# Patient Record
Sex: MALE | Race: WHITE | NOT HISPANIC OR LATINO | Employment: FULL TIME | URBAN - METROPOLITAN AREA
[De-identification: names, ages, dates, MRNs, and addresses within clinical notes are randomized per-mention and may not be internally consistent; named-entity substitution may affect disease eponyms.]

---

## 2023-09-30 ENCOUNTER — OFFICE VISIT (OUTPATIENT)
Dept: URGENT CARE | Facility: CLINIC | Age: 28
End: 2023-09-30
Payer: COMMERCIAL

## 2023-09-30 VITALS
DIASTOLIC BLOOD PRESSURE: 76 MMHG | HEART RATE: 77 BPM | TEMPERATURE: 97.1 F | HEIGHT: 70 IN | RESPIRATION RATE: 14 BRPM | SYSTOLIC BLOOD PRESSURE: 124 MMHG | WEIGHT: 232.1 LBS | OXYGEN SATURATION: 98 % | BODY MASS INDEX: 33.23 KG/M2

## 2023-09-30 DIAGNOSIS — M48.02 CERVICAL SPINAL STENOSIS: ICD-10-CM

## 2023-09-30 DIAGNOSIS — M54.2 CHRONIC NECK PAIN: ICD-10-CM

## 2023-09-30 DIAGNOSIS — G89.29 CHRONIC NECK PAIN: ICD-10-CM

## 2023-09-30 PROCEDURE — 99204 OFFICE O/P NEW MOD 45 MIN: CPT | Performed by: FAMILY MEDICINE

## 2023-09-30 PROCEDURE — 3078F DIAST BP <80 MM HG: CPT | Performed by: FAMILY MEDICINE

## 2023-09-30 PROCEDURE — 3074F SYST BP LT 130 MM HG: CPT | Performed by: FAMILY MEDICINE

## 2023-09-30 RX ORDER — PREDNISONE 20 MG/1
40 TABLET ORAL DAILY
Qty: 10 TABLET | Refills: 0 | Status: SHIPPED | OUTPATIENT
Start: 2023-09-30 | End: 2023-10-05

## 2023-09-30 ASSESSMENT — ENCOUNTER SYMPTOMS
WEIGHT LOSS: 0
EYE REDNESS: 0
VOMITING: 0
MYALGIAS: 0
EYE DISCHARGE: 0
NAUSEA: 0

## 2023-09-30 NOTE — LETTER
September 30, 2023         Patient: John Farrell   YOB: 1995   Date of Visit: 9/30/2023           To Whom it May Concern:    John Farrell was seen in my clinic on 9/30/2023. Please excuse from work today.     Sincerely,           Nicholas West M.D.  Electronically Signed      (0) independent

## 2023-09-30 NOTE — PROGRESS NOTES
"Zara Farrell is a 28 y.o. male who presents with Back Pain (Pt has back pain, neck pain x 2 weeks )            PMH chronic neck pain with known severe cervical stenosis thought to possibly have a genetic component. 2 weeks worsening pain associated with his work duties. Intermittent radiation to left arm. Intermittent  weakness/more on the left. No new trauma. No fever. No urinary retention, bowel incontinence or saddle anesthesia.         Review of Systems   Constitutional:  Negative for malaise/fatigue and weight loss.   Eyes:  Negative for discharge and redness.   Gastrointestinal:  Negative for nausea and vomiting.   Musculoskeletal:  Negative for joint pain and myalgias.   Skin:  Negative for itching and rash.              Objective     /76 (BP Location: Right arm, Patient Position: Sitting, BP Cuff Size: Large adult)   Pulse 77   Temp 36.2 °C (97.1 °F) (Temporal)   Resp 14   Ht 1.778 m (5' 10\")   Wt 105 kg (232 lb 1.6 oz)   SpO2 98%   BMI 33.30 kg/m²      Physical Exam  Constitutional:       General: He is not in acute distress.     Appearance: He is well-developed.   HENT:      Head: Normocephalic and atraumatic.   Eyes:      Conjunctiva/sclera: Conjunctivae normal.   Neck:      Comments: Tender midline. FROM. No deformity. Pain reproduced with full flexion.   Skin:     General: Skin is warm and dry.      Findings: No rash.   Neurological:      General: No focal deficit present.      Mental Status: He is alert.      Gait: Gait normal.      Comments: No appreciable weakness today.                              Assessment & Plan     Reviewed previous PCP note on his phone that stated MRI showed severe cervical stenosis C4-C5 region.     1. Cervical spinal stenosis  Referral to establish with [facility]    Referral to Spine Surgery    predniSONE (DELTASONE) 20 MG Tab      2. Chronic neck pain  Referral to establish with [facility] PCP    Referral to Spine Surgery    predniSONE " (DELTASONE) 20 MG Tab        Differential diagnosis, natural history, supportive care, and indications for immediate follow-up were discussed.     To ER with persistent weakness or myelopathy symptoms as discussed.

## 2023-10-14 SDOH — HEALTH STABILITY: PHYSICAL HEALTH: ON AVERAGE, HOW MANY DAYS PER WEEK DO YOU ENGAGE IN MODERATE TO STRENUOUS EXERCISE (LIKE A BRISK WALK)?: 3 DAYS

## 2023-10-14 SDOH — ECONOMIC STABILITY: FOOD INSECURITY: WITHIN THE PAST 12 MONTHS, THE FOOD YOU BOUGHT JUST DIDN'T LAST AND YOU DIDN'T HAVE MONEY TO GET MORE.: OFTEN TRUE

## 2023-10-14 SDOH — ECONOMIC STABILITY: INCOME INSECURITY: HOW HARD IS IT FOR YOU TO PAY FOR THE VERY BASICS LIKE FOOD, HOUSING, MEDICAL CARE, AND HEATING?: SOMEWHAT HARD

## 2023-10-14 SDOH — HEALTH STABILITY: PHYSICAL HEALTH: ON AVERAGE, HOW MANY MINUTES DO YOU ENGAGE IN EXERCISE AT THIS LEVEL?: 30 MIN

## 2023-10-14 SDOH — ECONOMIC STABILITY: TRANSPORTATION INSECURITY
IN THE PAST 12 MONTHS, HAS THE LACK OF TRANSPORTATION KEPT YOU FROM MEDICAL APPOINTMENTS OR FROM GETTING MEDICATIONS?: NO

## 2023-10-14 SDOH — ECONOMIC STABILITY: HOUSING INSECURITY: IN THE LAST 12 MONTHS, HOW MANY PLACES HAVE YOU LIVED?: 2

## 2023-10-14 SDOH — ECONOMIC STABILITY: HOUSING INSECURITY
IN THE LAST 12 MONTHS, WAS THERE A TIME WHEN YOU DID NOT HAVE A STEADY PLACE TO SLEEP OR SLEPT IN A SHELTER (INCLUDING NOW)?: NO

## 2023-10-14 SDOH — ECONOMIC STABILITY: FOOD INSECURITY: WITHIN THE PAST 12 MONTHS, YOU WORRIED THAT YOUR FOOD WOULD RUN OUT BEFORE YOU GOT MONEY TO BUY MORE.: NEVER TRUE

## 2023-10-14 SDOH — ECONOMIC STABILITY: INCOME INSECURITY: IN THE LAST 12 MONTHS, WAS THERE A TIME WHEN YOU WERE NOT ABLE TO PAY THE MORTGAGE OR RENT ON TIME?: NO

## 2023-10-14 SDOH — ECONOMIC STABILITY: TRANSPORTATION INSECURITY
IN THE PAST 12 MONTHS, HAS LACK OF TRANSPORTATION KEPT YOU FROM MEETINGS, WORK, OR FROM GETTING THINGS NEEDED FOR DAILY LIVING?: NO

## 2023-10-14 SDOH — HEALTH STABILITY: MENTAL HEALTH
STRESS IS WHEN SOMEONE FEELS TENSE, NERVOUS, ANXIOUS, OR CAN'T SLEEP AT NIGHT BECAUSE THEIR MIND IS TROUBLED. HOW STRESSED ARE YOU?: TO SOME EXTENT

## 2023-10-14 SDOH — ECONOMIC STABILITY: TRANSPORTATION INSECURITY
IN THE PAST 12 MONTHS, HAS LACK OF RELIABLE TRANSPORTATION KEPT YOU FROM MEDICAL APPOINTMENTS, MEETINGS, WORK OR FROM GETTING THINGS NEEDED FOR DAILY LIVING?: NO

## 2023-10-14 ASSESSMENT — SOCIAL DETERMINANTS OF HEALTH (SDOH)
ARE YOU MARRIED, WIDOWED, DIVORCED, SEPARATED, NEVER MARRIED, OR LIVING WITH A PARTNER?: NEVER MARRIED
HOW HARD IS IT FOR YOU TO PAY FOR THE VERY BASICS LIKE FOOD, HOUSING, MEDICAL CARE, AND HEATING?: SOMEWHAT HARD
HOW OFTEN DO YOU GET TOGETHER WITH FRIENDS OR RELATIVES?: MORE THAN THREE TIMES A WEEK
WITHIN THE PAST 12 MONTHS, YOU WORRIED THAT YOUR FOOD WOULD RUN OUT BEFORE YOU GOT THE MONEY TO BUY MORE: NEVER TRUE
DO YOU BELONG TO ANY CLUBS OR ORGANIZATIONS SUCH AS CHURCH GROUPS UNIONS, FRATERNAL OR ATHLETIC GROUPS, OR SCHOOL GROUPS?: NO
IN A TYPICAL WEEK, HOW MANY TIMES DO YOU TALK ON THE PHONE WITH FAMILY, FRIENDS, OR NEIGHBORS?: MORE THAN THREE TIMES A WEEK
HOW OFTEN DO YOU ATTEND CHURCH OR RELIGIOUS SERVICES?: PATIENT DECLINED
HOW OFTEN DO YOU HAVE SIX OR MORE DRINKS ON ONE OCCASION: NEVER
HOW OFTEN DO YOU ATTENT MEETINGS OF THE CLUB OR ORGANIZATION YOU BELONG TO?: NEVER
HOW OFTEN DO YOU HAVE A DRINK CONTAINING ALCOHOL: MONTHLY OR LESS
HOW OFTEN DO YOU ATTENT MEETINGS OF THE CLUB OR ORGANIZATION YOU BELONG TO?: NEVER
HOW OFTEN DO YOU GET TOGETHER WITH FRIENDS OR RELATIVES?: MORE THAN THREE TIMES A WEEK
IN A TYPICAL WEEK, HOW MANY TIMES DO YOU TALK ON THE PHONE WITH FAMILY, FRIENDS, OR NEIGHBORS?: MORE THAN THREE TIMES A WEEK
DO YOU BELONG TO ANY CLUBS OR ORGANIZATIONS SUCH AS CHURCH GROUPS UNIONS, FRATERNAL OR ATHLETIC GROUPS, OR SCHOOL GROUPS?: NO
HOW OFTEN DO YOU ATTEND CHURCH OR RELIGIOUS SERVICES?: PATIENT DECLINED
HOW MANY DRINKS CONTAINING ALCOHOL DO YOU HAVE ON A TYPICAL DAY WHEN YOU ARE DRINKING: 1 OR 2
ARE YOU MARRIED, WIDOWED, DIVORCED, SEPARATED, NEVER MARRIED, OR LIVING WITH A PARTNER?: NEVER MARRIED

## 2023-10-14 ASSESSMENT — LIFESTYLE VARIABLES
HOW MANY STANDARD DRINKS CONTAINING ALCOHOL DO YOU HAVE ON A TYPICAL DAY: 1 OR 2
AUDIT-C TOTAL SCORE: 1
SKIP TO QUESTIONS 9-10: 1
HOW OFTEN DO YOU HAVE SIX OR MORE DRINKS ON ONE OCCASION: NEVER
HOW OFTEN DO YOU HAVE A DRINK CONTAINING ALCOHOL: MONTHLY OR LESS

## 2023-10-16 ENCOUNTER — OFFICE VISIT (OUTPATIENT)
Dept: MEDICAL GROUP | Facility: MEDICAL CENTER | Age: 28
End: 2023-10-16
Payer: COMMERCIAL

## 2023-10-16 VITALS
HEIGHT: 70 IN | DIASTOLIC BLOOD PRESSURE: 74 MMHG | WEIGHT: 229 LBS | BODY MASS INDEX: 32.78 KG/M2 | HEART RATE: 65 BPM | TEMPERATURE: 98.8 F | SYSTOLIC BLOOD PRESSURE: 120 MMHG

## 2023-10-16 DIAGNOSIS — F41.1 GAD (GENERALIZED ANXIETY DISORDER): ICD-10-CM

## 2023-10-16 DIAGNOSIS — Z11.59 ENCOUNTER FOR HEPATITIS C SCREENING TEST FOR LOW RISK PATIENT: ICD-10-CM

## 2023-10-16 DIAGNOSIS — Z00.00 PREVENTATIVE HEALTH CARE: ICD-10-CM

## 2023-10-16 DIAGNOSIS — M54.12 CERVICAL RADICULOPATHY: ICD-10-CM

## 2023-10-16 DIAGNOSIS — F33.1 MODERATE EPISODE OF RECURRENT MAJOR DEPRESSIVE DISORDER (HCC): ICD-10-CM

## 2023-10-16 PROBLEM — F32.9 MAJOR DEPRESSIVE DISORDER: Status: ACTIVE | Noted: 2023-10-16

## 2023-10-16 PROCEDURE — 3074F SYST BP LT 130 MM HG: CPT | Performed by: PHYSICIAN ASSISTANT

## 2023-10-16 PROCEDURE — 3078F DIAST BP <80 MM HG: CPT | Performed by: PHYSICIAN ASSISTANT

## 2023-10-16 PROCEDURE — 99214 OFFICE O/P EST MOD 30 MIN: CPT | Performed by: PHYSICIAN ASSISTANT

## 2023-10-16 ASSESSMENT — PATIENT HEALTH QUESTIONNAIRE - PHQ9
CLINICAL INTERPRETATION OF PHQ2 SCORE: 3
5. POOR APPETITE OR OVEREATING: 1 - SEVERAL DAYS
SUM OF ALL RESPONSES TO PHQ QUESTIONS 1-9: 15

## 2023-10-16 NOTE — LETTER
[employer]  Dioni Bergeron P.A.-C.  [address]  [place] [address]  Fax: [phone]   Authorization for Release/Disclosure of   Protected Health Information   Name: ROSAMARIA BAPTISTE : 1995 SSN: [id number]   Address: [address] [place] [address]  [address]  [place] [address] Phone:    [phone] (home)    I authorize the entity listed below to release/disclose the PHI below to:   [employer]/Dioni Bergeron P.A.-C. and Dioni Bergeron P.A.-C.   Provider [facility] Entity Name[facility]  [facility]   Address   City, State, Zip   Phone:      Fax:     Reason for request: continuity of care   Information to be released: Neurology Inpatient Evaluation/Imaging   [  ] LAST COLONOSCOPY,  including any PATH REPORT and follow-up  [  ] LAST FIT/COLOGUARD RESULT [  ] LAST DEXA  [  ] LAST MAMMOGRAM  [  ] LAST PAP  [  ] LAST LABS [  ] RETINA EXAM REPORT  [  ] IMMUNIZATION RECORDS  [  ] Release all info      [  ] Check here and initial the line next to each item to release ALL health information INCLUDING  _____ Care and treatment for drug and / or alcohol abuse  _____ HIV testing, infection status, or AIDS  _____ Genetic Testing    DATES OF SERVICE OR TIME PERIOD TO BE DISCLOSED: _____________  I understand and acknowledge that:  * This Authorization may be revoked at any time by you in writing, except if your health information has already been used or disclosed.  * Your health information that will be used or disclosed as a result of you signing this authorization could be re-disclosed by the recipient. If this occurs, your re-disclosed health information may no longer be protected by State or Federal laws.  * You may refuse to sign this Authorization. Your refusal will not affect your ability to obtain treatment.  * This Authorization becomes effective upon signing and will  on (date) __________.      If no date is indicated, this Authorization will  one (1) year from the signature date.    Name:  John Farrell  Signature: Date:   10/16/2023     PLEASE FAX REQUESTED RECORDS BACK TO: [phone]

## 2023-10-16 NOTE — ASSESSMENT & PLAN NOTE
Chronic history of neck pain.  Deals with pain that radiates down into the arms.  Minimal neuropathy.  Has an appointment soon at [facility] neurosurgery.  Wants an accommodation letter for [employer].  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  Try to get the accommodation prior to the higher date but was unsuccessful.

## 2023-10-16 NOTE — LETTER
October 16, 2023         Patient: John Farrell   YOB: 1995   Date of Visit: 10/16/2023           To Whom it May Concern:    John Farrell was seen in my clinic on 10/16/2023. He has chronic neck pain.  This letter is an accomodation notice so he does not lift more than ten pounds at work. He is following with a neurosurgery group.    If you have any questions or concerns, please don't hesitate to call. Thank you.        Sincerely,           Dioni Bergeron P.A.-C.  Electronically Signed

## 2023-10-16 NOTE — PROGRESS NOTES
Subjective:   John Farrell is a 28 y.o. male here today for depression and cervical radiculopathy.    Major depressive disorder  This is a pleasant 28-year-old male here today to establish care.  Chronic history of both anxiety and depression.  Has been on several medications in the past.  No improvement.  Does not wish to start medication.  Plans to follow-up with therapy through his employer.  He is currently with [employer].    Cervical radiculopathy  Chronic history of neck pain.  Deals with pain that radiates down into the arms.  Minimal neuropathy.  Has an appointment soon at [facility].  Wants an accommodation letter for [employer].  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  Try to get the accommodation prior to the higher date but was unsuccessful.       Current medicines (including changes today)  No current outpatient medications on file.     No current facility-administered medications for this visit.     He  has a past medical history of Cervical radiculopathy (10/16/2023), RADHA (generalized anxiety disorder) (10/16/2023), Hypertension, and Major depressive disorder (10/16/2023).    He has no past medical history of Addisons disease ([facility]), Adrenal disorder ([facility]), Allergy, Anemia, Arrhythmia, Arthritis, Asthma, Blood transfusion without reported diagnosis, Cancer ([facility]), Cataract, CHF (congestive heart failure) ([facility]), Clotting disorder ([facility]), COPD (chronic obstructive pulmonary disease) ([facility]), Cushings syndrome ([facility]), Diabetes ([facility]), Diabetic neuropathy ([facility]), GERD (gastroesophageal reflux disease), Glaucoma, Goiter, Head ache, Heart attack ([facility]), Heart murmur, HIV (human immunodeficiency virus infection) ([facility]), Hyperlipidemia, IBD (inflammatory bowel disease), Kidney disease, Meningitis, Migraine, Osteoporosis, Parathyroid disorder ([facility]), Pituitary disease ([facility]), Pulmonary emphysema ([facility]), Seizure ([facility]), Sickle cell disease ([facility]), Stroke ([facility]), Substance abuse ([facility]), Thyroid disease, Tuberculosis, or  "Urinary tract infection.    Social History and Family History were reviewed and updated.    ROS   No chest pain, no shortness of breath, no abdominal pain and all other systems were reviewed and are negative.       Objective:     /74 (BP Location: Left arm, Patient Position: Sitting, BP Cuff Size: Adult)   Pulse 65   Temp 37.1 °C (98.8 °F) (Temporal)   Ht 1.778 m (5' 10\")   Wt 104 kg (229 lb)  Body mass index is 32.86 kg/m².   Physical Exam:  Constitutional: Alert, no distress.  Skin: Warm, dry, good turgor, no rashes in visible areas.  Eye: Equal, round and reactive, conjunctiva clear, lids normal.  ENMT: Lips without lesions, good dentition, oropharynx clear.  Neck: Trachea midline, no masses.   Respiratory: Unlabored respiratory effort.  Psych: Alert and oriented x3, normal affect and mood.        Assessment and Plan:   The following treatment plan was discussed    1. Moderate episode of recurrent major depressive disorder (HCC)  Chronic condition.  Uncontrolled.  Refer to psychology.  Discussed my concerns regarding his PHQ-9 score.  States he has been feeling that way for many years.  - Patient has been identified as having a positive depression screening. Appropriate orders and counseling have been given.  - Referral to Psychology    2. RADHA (generalized anxiety disorder)  Chronic condition.  Stable.  Referred to psychology.  - Patient has been identified as having a positive depression screening. Appropriate orders and counseling have been given.  - Referral to Psychology    3. Cervical radiculopathy  Chronic condition.  Has appointment soon with [facility] neurosurgery.  Discussed test was accommodation process.  Wrote letter to start the accommodation process.  I may need to see him in follow-up to complete the form.    4. Preventative health care  Ordered labs.  Fast 8 hours.  He will be contacted with the results.  - CBC WITH DIFFERENTIAL; Future  - Comp Metabolic Panel; Future  - HEMOGLOBIN A1C; " Future  - Lipid Profile; Future  - TSH WITH REFLEX TO FT4; Future    5. Encounter for hepatitis C screening test for low risk patient  Hep C viral antibody ordered.  Low risk.  - HEP C VIRUS ANTIBODY; Future         Followup: Return in about 6 months (around 4/16/2024), or if symptoms worsen or fail to improve.    Please note that this dictation was created using voice recognition software. I have made every reasonable attempt to correct obvious errors, but I expect that there are errors of grammar and possibly content that I did not discover before finalizing the note.

## 2023-10-16 NOTE — ASSESSMENT & PLAN NOTE
This is a pleasant 28-year-old male here today to establish care.  Chronic history of both anxiety and depression.  Has been on several medications in the past.  No improvement.  Does not wish to start medication.  Plans to follow-up with therapy through his employer.  He is currently with [employer].   S[employer]gard Body: The suture ends were repeatedly re-tightened and re-clamped to achieve the desired tissue expansion.

## 2023-10-26 ENCOUNTER — OFFICE VISIT (OUTPATIENT)
Dept: MEDICAL GROUP | Facility: MEDICAL CENTER | Age: 28
End: 2023-10-26
Payer: COMMERCIAL

## 2023-10-26 VITALS
HEIGHT: 70 IN | DIASTOLIC BLOOD PRESSURE: 70 MMHG | SYSTOLIC BLOOD PRESSURE: 106 MMHG | TEMPERATURE: 97.3 F | HEART RATE: 85 BPM | BODY MASS INDEX: 33.1 KG/M2 | WEIGHT: 231.2 LBS

## 2023-10-26 DIAGNOSIS — M54.12 CERVICAL RADICULOPATHY: ICD-10-CM

## 2023-10-26 DIAGNOSIS — G89.29 CHRONIC NECK PAIN: ICD-10-CM

## 2023-10-26 DIAGNOSIS — M54.2 CHRONIC NECK PAIN: ICD-10-CM

## 2023-10-26 PROCEDURE — 99214 OFFICE O/P EST MOD 30 MIN: CPT | Performed by: PHYSICIAN ASSISTANT

## 2023-10-26 PROCEDURE — 3078F DIAST BP <80 MM HG: CPT | Performed by: PHYSICIAN ASSISTANT

## 2023-10-26 PROCEDURE — 3074F SYST BP LT 130 MM HG: CPT | Performed by: PHYSICIAN ASSISTANT

## 2023-10-26 NOTE — PROGRESS NOTES
Subjective:   John Farrell is a 28 y.o. male here today for chronic neck pain with radiculopathy.    Chronic neck pain  This is a pleasant 28-year-old male here today to have accommodation form completed through [employer].  Would like accommodations not to lift more than 10 pounds at work.  Has a history of chronic neck pain that radiates down the shoulders.  Recently seen at [facility].  Conservative therapy first.  MRI then may be in 6 weeks.  He states that the neck pain is still constant.  Makes it difficult to lift anything more than 10 pounds.  Also makes it difficult when he does range of motion with the neck especially looking up.  Diagnosed in the past with cervical stenosis.       Current medicines (including changes today)  No current outpatient medications on file.     No current facility-administered medications for this visit.     He  has a past medical history of Cervical radiculopathy (10/16/2023), RADHA (generalized anxiety disorder) (10/16/2023), Hypertension, and Major depressive disorder (10/16/2023).    He has no past medical history of Addisons disease ([facility]), Adrenal disorder ([facility]), Allergy, Anemia, Arrhythmia, Arthritis, Asthma, Blood transfusion without reported diagnosis, Cancer ([facility]), Cataract, CHF (congestive heart failure) ([facility]), Clotting disorder ([facility]), COPD (chronic obstructive pulmonary disease) ([facility]), Cushings syndrome ([facility]), Diabetes ([facility]), Diabetic neuropathy ([facility]), GERD (gastroesophageal reflux disease), Glaucoma, Goiter, Head ache, Heart attack ([facility]), Heart murmur, HIV (human immunodeficiency virus infection) ([facility]), Hyperlipidemia, IBD (inflammatory bowel disease), Kidney disease, Meningitis, Migraine, Osteoporosis, Parathyroid disorder ([facility]), Pituitary disease ([facility]), Pulmonary emphysema ([facility]), Seizure ([facility]), Sickle cell disease ([facility]), Stroke ([facility]), Substance abuse ([facility]), Thyroid disease, Tuberculosis, or Urinary tract infection.    Social History and Family History were  "reviewed and updated.    ROS   No chest pain, no shortness of breath, no abdominal pain and all other systems were reviewed and are negative.       Objective:     /70 (BP Location: Right arm, Patient Position: Sitting, BP Cuff Size: Adult)   Pulse 85   Temp 36.3 °C (97.3 °F) (Temporal)   Ht 1.778 m (5' 10\")   Wt 105 kg (231 lb 3.2 oz)  Body mass index is 33.17 kg/m².   Physical Exam:  Constitutional: Alert, no distress.  Skin: Warm, dry, good turgor, no rashes in visible areas.  Eye: Equal, round and reactive, conjunctiva clear, lids normal.  ENMT: Lips without lesions, good dentition, oropharynx clear.  Neck: Trachea midline, no masses.   Psych: Alert and oriented x3, normal affect and mood.        Assessment and Plan:   The following treatment plan was discussed    1. Chronic neck pain  Chronic condition.  Continue his pain of the neck.  Exacerbated by motion as well as lifting.  Continue to follow at [facility] neurosurgery.  Follow-up with them for PT.  May have an MRI scheduled in the future.  Diagnosed in the past it appears with stenosis.  Completed form for accommodations.  Not to lift more than 10 pounds.  Forms were completed and returned to patient.    2. Cervical radiculopathy  Chronic condition.  Unknown cause.  Continue to follow at [facility] neurosurgery.  Combination forms were completed and returned to patient.         Followup: Return in about 4 weeks (around 11/23/2023), or if symptoms worsen or fail to improve.    Please note that this dictation was created using voice recognition software. I have made every reasonable attempt to correct obvious errors, but I expect that there are errors of grammar and possibly content that I did not discover before finalizing the note.             "

## 2023-10-26 NOTE — ASSESSMENT & PLAN NOTE
This is a pleasant 28-year-old male here today to have accommodation form completed through [employer].  Would like accommodations not to lift more than 10 pounds at work.  Has a history of chronic neck pain that radiates down the shoulders.  Recently seen at [facility] neurosurgery.  Conservative therapy first.  MRI then may be in 6 weeks.  He states that the neck pain is still constant.  Makes it difficult to lift anything more than 10 pounds.  Also makes it difficult when he does range of motion with the neck especially looking up.  Diagnosed in the past with cervical stenosis.

## 2023-11-27 ENCOUNTER — APPOINTMENT (OUTPATIENT)
Dept: MEDICAL GROUP | Facility: MEDICAL CENTER | Age: 28
End: 2023-11-27
Payer: COMMERCIAL